# Patient Record
(demographics unavailable — no encounter records)

---

## 2024-12-13 NOTE — CARDIOLOGY SUMMARY
[___] : [unfilled] [Normal] : normal [de-identified] : 12/12/24: sinus rhythm at 72bpm 2/2/24: sinus rhythm   [de-identified] : 2/2023 12.8 METS  2020 exercise stress test  13 METS [de-identified] : 2/2023 LVEF 61% mild MR, mild AR, mild TR  2020 EF 60% NML LV/RV [de-identified] : CTA cors 2016 calcium score 0 no coronary atherosclerosis

## 2024-12-13 NOTE — CARDIOLOGY SUMMARY
[___] : [unfilled] [Normal] : normal [de-identified] : 12/12/24: sinus rhythm at 72bpm 2/2/24: sinus rhythm   [de-identified] : 2/2023 12.8 METS  2020 exercise stress test  13 METS [de-identified] : 2/2023 LVEF 61% mild MR, mild AR, mild TR  2020 EF 60% NML LV/RV [de-identified] : CTA cors 2016 calcium score 0 no coronary atherosclerosis

## 2024-12-13 NOTE — ADDENDUM
[FreeTextEntry1] : Doing well from cardiac standpoint. Coronary CTA without obstructive CAD. Continue current blood pressure regimen

## 2024-12-13 NOTE — PHYSICAL EXAM
[Well Developed] : well developed [Well Nourished] : well nourished [No Acute Distress] : no acute distress [Normal Venous Pressure] : normal venous pressure [No Carotid Bruit] : no carotid bruit [Normal S1, S2] : normal S1, S2 [No Rub] : no rub [No Gallop] : no gallop [Rhythm Regular] : regular [I] : a grade 1 [Clear Lung Fields] : clear lung fields [Good Air Entry] : good air entry [No Respiratory Distress] : no respiratory distress  [Soft] : abdomen soft [Non Tender] : non-tender [No Masses/organomegaly] : no masses/organomegaly [Normal Bowel Sounds] : normal bowel sounds [Normal Gait] : normal gait [No Edema] : no edema [No Cyanosis] : no cyanosis [No Clubbing] : no clubbing [No Varicosities] : no varicosities [No Rash] : no rash [No Skin Lesions] : no skin lesions [Moves all extremities] : moves all extremities [No Focal Deficits] : no focal deficits [Normal Speech] : normal speech [Alert and Oriented] : alert and oriented [Normal memory] : normal memory [General Appearance - Well Developed] : well developed [Normal Appearance] : normal appearance [Well Groomed] : well groomed [General Appearance - Well Nourished] : well nourished [No Deformities] : no deformities [General Appearance - In No Acute Distress] : no acute distress [Normal Conjunctiva] : the conjunctiva exhibited no abnormalities [Eyelids - No Xanthelasma] : the eyelids demonstrated no xanthelasmas [Normal Oral Mucosa] : normal oral mucosa [No Oral Pallor] : no oral pallor [No Oral Cyanosis] : no oral cyanosis [Normal Jugular Venous A Waves Present] : normal jugular venous A waves present [Normal Jugular Venous V Waves Present] : normal jugular venous V waves present [No Jugular Venous Meneses A Waves] : no jugular venous meneses A waves [Normal Rate] : normal [Normal S1] : normal S1 [Normal S2] : normal S2 [No Murmur] : no murmurs heard [2+] : left 2+ [No Abnormalities] : the abdominal aorta was not enlarged and no bruit was heard [No Pitting Edema] : no pitting edema present [Respiration, Rhythm And Depth] : normal respiratory rhythm and effort [Exaggerated Use Of Accessory Muscles For Inspiration] : no accessory muscle use [Auscultation Breath Sounds / Voice Sounds] : lungs were clear to auscultation bilaterally [Abdomen Soft] : soft [Abdomen Tenderness] : non-tender [Abdomen Mass (___ Cm)] : no abdominal mass palpated [Abnormal Walk] : normal gait [Gait - Sufficient For Exercise Testing] : the gait was sufficient for exercise testing [Nail Clubbing] : no clubbing of the fingernails [Cyanosis, Localized] : no localized cyanosis [Petechial Hemorrhages (___cm)] : no petechial hemorrhages [Skin Color & Pigmentation] : normal skin color and pigmentation [] : no rash [No Venous Stasis] : no venous stasis [Skin Lesions] : no skin lesions [No Skin Ulcers] : no skin ulcer [No Xanthoma] : no  xanthoma was observed [Oriented To Time, Place, And Person] : oriented to person, place, and time [Affect] : the affect was normal [Mood] : the mood was normal [No Anxiety] : not feeling anxious [S3] : no S3 [S4] : no S4 [Right Carotid Bruit] : no bruit heard over the right carotid [Left Carotid Bruit] : no bruit heard over the left carotid [Right Femoral Bruit] : no bruit heard over the right femoral artery [Left Femoral Bruit] : no bruit heard over the left femoral artery

## 2024-12-13 NOTE — DISCUSSION/SUMMARY
[EKG obtained to assist in diagnosis and management of assessed problem(s)] : EKG obtained to assist in diagnosis and management of assessed problem(s) [FreeTextEntry1] : This is a 65 year old Woman with a history HTN and hyperlipidemia who presents for a follow up.   She is in no acute distress.  She is euvolemic on exam.  Her EKG demonstrates a sinus rhythm without obvious ischemia or chamber enlargement.  Her blood pressure is stable.  Her physical exam is unremarkable.  She completed non invasive cardiac testing that was unrevealing. Echocardiogram with a normal LVEF 61%. NML exercise stress test as well as a CTA coronary with a calcium score of 0 and normal coronaries.  She will continue her blood pressure medication with Amlodipine 5mg AM 2.5mg in PM.  Previous 24 hour B/P demonstrated AVG of 126/68.    Diet, exercise and increasing her hydration was lectured.  As well as the importance of diet-low salt and getting 7-8 hours sleep to reduce her blood pressure, and overall cardiovascular risk.   She will follow-up in 6 months, sooner with any questions or concerns.

## 2024-12-13 NOTE — REVIEW OF SYSTEMS
[Muscle Cramps] : muscle cramps [Numbness (Hypoesthesia)] : numbness [Negative] : Cardiovascular [Joint Pain] : no joint pain [Joint Swelling] : no joint swelling [Joint Stiffness] : no joint stiffness [Myalgia] : no myalgia [Dizziness] : no dizziness [Tremor] : no tremor was seen [Convulsions] : no convulsions [Tingling (Paresthesia)] : no tingling [Weakness] : no weakness [Limb Weakness (Paresis)] : no limb weakness (Paresis) [Speech Disturbance] : no speech disturbance [FreeTextEntry5] : see HPI [FreeTextEntry9] : Muscle cramps in her calves  [de-identified] : Numbness and tingling in her fingers

## 2024-12-13 NOTE — HISTORY OF PRESENT ILLNESS
[FreeTextEntry1] : This is a 65 year old Woman with a history HTN and hyperlipidemia who presents for a follow up.    She is generally healthy. Her past medical history is notable for hypothyroidism, and she currently takes Levothyroxine. Her family history is notable for the fact that her brother  at a young age with cardiac arrest while driving.  Previously, she went to the emergency room with dizziness and elevated blood pressure. On that date, her systolic blood pressure was 199. This resolved without intervention in the ER. Blood work was not sent. She has since been checking her blood pressures at home, which have been in the 130-160 range.  She has also previously reported chest pains while exercising. She reports a coronary CT about 2 years ago, that demonstrated no coronary artery disease. She has been under a lot of stress as of late. She is going through a divorce and has been taking care of her sick sister who has been hospitalized for the last month. Her sister has a 4 cm aneurysm of her thoracic aorta.  Since her last visit in 2024, she complains of numbness in her bilaterally and cramping in her both her calves.  She complains of gaining weight but watches what she eats.  She admits to not hydrating enough but takes an exercising class 3-4 times a week. She does not get enough sleep.   She complains of waking up in the middle of the night and staying up or going to bed late at night.  She checks her B/P at home after her medications and her B/P ranges 130-150 at times 160.  She reports majority of her readings are 147/70.  She denies chest pain, chest pressure, palpitations, SOB, MERINO, lower extremity swelling, orthopnea, PND, dizziness, lightheadedness, near syncope or syncope.    Medication reconciliation was performed.  She is compliant with her medications.

## 2024-12-13 NOTE — REVIEW OF SYSTEMS
[Muscle Cramps] : muscle cramps [Numbness (Hypoesthesia)] : numbness [Negative] : Cardiovascular [Joint Pain] : no joint pain [Joint Swelling] : no joint swelling [Joint Stiffness] : no joint stiffness [Myalgia] : no myalgia [Dizziness] : no dizziness [Tremor] : no tremor was seen [Convulsions] : no convulsions [Tingling (Paresthesia)] : no tingling [Weakness] : no weakness [Limb Weakness (Paresis)] : no limb weakness (Paresis) [Speech Disturbance] : no speech disturbance [FreeTextEntry5] : see HPI [FreeTextEntry9] : Muscle cramps in her calves  [de-identified] : Numbness and tingling in her fingers

## 2025-01-31 NOTE — PHYSICAL EXAM
[No Acute Distress] : no acute distress [Well Nourished] : well nourished [Well Developed] : well developed [Well-Appearing] : well-appearing [No Lymphadenopathy] : no lymphadenopathy [Thyroid Normal, No Nodules] : the thyroid was normal and there were no nodules present [No Respiratory Distress] : no respiratory distress  [No Accessory Muscle Use] : no accessory muscle use [Clear to Auscultation] : lungs were clear to auscultation bilaterally [Normal Rate] : normal rate  [Regular Rhythm] : with a regular rhythm [Normal S1, S2] : normal S1 and S2 [No Murmur] : no murmur heard [No Carotid Bruits] : no carotid bruits [No Abdominal Bruit] : a ~M bruit was not heard ~T in the abdomen [No Edema] : there was no peripheral edema [Soft] : abdomen soft [Non Tender] : non-tender [Non-distended] : non-distended [Normal Bowel Sounds] : normal bowel sounds [Normal Gait] : normal gait [Alert and Oriented x3] : oriented to person, place, and time

## 2025-01-31 NOTE — PHYSICAL EXAM
[No Acute Distress] : no acute distress [Well Nourished] : well nourished [Well Developed] : well developed [Well-Appearing] : well-appearing [No Lymphadenopathy] : no lymphadenopathy [Thyroid Normal, No Nodules] : the thyroid was normal and there were no nodules present [No Respiratory Distress] : no respiratory distress  [No Accessory Muscle Use] : no accessory muscle use [Clear to Auscultation] : lungs were clear to auscultation bilaterally [Normal Rate] : normal rate  [Regular Rhythm] : with a regular rhythm [Normal S1, S2] : normal S1 and S2 [No Murmur] : no murmur heard [No Carotid Bruits] : no carotid bruits [No Abdominal Bruit] : a ~M bruit was not heard ~T in the abdomen [No Edema] : there was no peripheral edema [Non Tender] : non-tender [Soft] : abdomen soft [Non-distended] : non-distended [Normal Bowel Sounds] : normal bowel sounds [Normal Gait] : normal gait [Alert and Oriented x3] : oriented to person, place, and time

## 2025-02-05 NOTE — ASSESSMENT
[As per surgery] : as per surgery [Patient Optimized for Surgery] : Patient optimized for surgery [FreeTextEntry4] : Edith is here today for preop assessment for planned nephrectomy, salpingectomy, and hysteroscopy.  She is low risk for this moderate risk procedure.  Hold her 2.5 mg dose the night before surgery and her morning amlodipine dose.  She can take levothyroxine as prescribed.  No aspirin, NSAIDs, vitamins or supplements for 1 week prior to procedure.  She is most optimal condition for proposed procedure.

## 2025-02-05 NOTE — REVIEW OF SYSTEMS
[Fatigue] : fatigue [Fever] : no fever [Chills] : no chills [Night Sweats] : no night sweats [Chest Pain] : no chest pain [Palpitations] : no palpitations [Shortness Of Breath] : no shortness of breath [Wheezing] : no wheezing [Cough] : no cough [Abdominal Pain] : no abdominal pain [Nausea] : no nausea [Constipation] : no constipation [Diarrhea] : diarrhea [Vomiting] : no vomiting [Dysuria] : no dysuria [Hematuria] : no hematuria [Joint Pain] : no joint pain [Muscle Pain] : no muscle pain [Headache] : no headache [Dizziness] : no dizziness

## 2025-02-05 NOTE — ASSESSMENT
[Patient Optimized for Surgery] : Patient optimized for surgery [As per surgery] : as per surgery [FreeTextEntry4] : Edith is here today for preop assessment for planned nephrectomy, salpingectomy, and hysteroscopy.  She is low risk for this moderate risk procedure.  Hold her 2.5 mg dose the night before surgery and her morning amlodipine dose.  She can take levothyroxine as prescribed.  No aspirin, NSAIDs, vitamins or supplements for 1 week prior to procedure.  She is most optimal condition for proposed procedure.

## 2025-02-05 NOTE — HISTORY OF PRESENT ILLNESS
[No Pertinent Cardiac History] : no history of aortic stenosis, atrial fibrillation, coronary artery disease, recent myocardial infarction, or implantable device/pacemaker [No Pertinent Pulmonary History] : no history of asthma, COPD, sleep apnea, or smoking [(Patient denies any chest pain, claudication, dyspnea on exertion, orthopnea, palpitations or syncope)] : Patient denies any chest pain, claudication, dyspnea on exertion, orthopnea, palpitations or syncope [Moderate (4-6 METs)] : Moderate (4-6 METs) [Family Member] : no family member with adverse anesthesia reaction/sudden death [Chronic Anticoagulation] : no chronic anticoagulation [Chronic Kidney Disease] : no chronic kidney disease [Diabetes] : no diabetes [FreeTextEntry1] : Bilateral oophorectomy, salpingectomy, hysteroscopy [FreeTextEntry2] : 02/08/25 [FreeTextEntry3] : Dr Ross  [FreeTextEntry4] : Edith is here today for preop assessment for planned nephrectomy, salpingectomy and hysteroscopy for ovarian lesion.  She has a history of hypertension and hypothyroidism.  She has not had surgery before.  There is no family history of adverse effects to anesthesia.  No personal or family history of bleeding or VTE complications.  She has no chest pain, palpitations, shortness of breath, dizziness, or lightheadedness.  She exercises at the gym 3 times weekly.  She does not snore.  She is not currently smoking cigarettes.